# Patient Record
Sex: MALE | Race: WHITE | NOT HISPANIC OR LATINO | Employment: UNEMPLOYED
[De-identification: names, ages, dates, MRNs, and addresses within clinical notes are randomized per-mention and may not be internally consistent; named-entity substitution may affect disease eponyms.]

---

## 2017-10-19 ENCOUNTER — HOSPITAL (OUTPATIENT)
Dept: OTHER | Age: 67
End: 2017-10-19

## 2018-06-15 ENCOUNTER — HOSPITAL (OUTPATIENT)
Dept: OTHER | Age: 68
End: 2018-06-15
Attending: SPECIALIST

## 2020-12-07 DIAGNOSIS — R06.02 SOB (SHORTNESS OF BREATH): Primary | ICD-10-CM

## 2020-12-09 ENCOUNTER — HOSPITAL ENCOUNTER (OUTPATIENT)
Dept: CARDIOLOGY | Age: 70
Discharge: HOME | End: 2020-12-09
Attending: SPECIALIST

## 2020-12-09 ENCOUNTER — HOSPITAL ENCOUNTER (OUTPATIENT)
Dept: NUCLEAR MEDICINE | Age: 70
Discharge: HOME | End: 2020-12-09
Attending: SPECIALIST

## 2020-12-09 ENCOUNTER — LAB SERVICES (OUTPATIENT)
Dept: LAB | Age: 70
End: 2020-12-09
Attending: SPECIALIST

## 2020-12-09 DIAGNOSIS — R06.02 SOB (SHORTNESS OF BREATH): ICD-10-CM

## 2020-12-09 DIAGNOSIS — D69.6 THROMBOCYTOPENIA, UNSPECIFIED (CMD): ICD-10-CM

## 2020-12-09 DIAGNOSIS — N40.3 NODULAR PROSTATE WITH URINARY OBSTRUCTION: ICD-10-CM

## 2020-12-09 DIAGNOSIS — R94.31 ABNORMAL ECG: ICD-10-CM

## 2020-12-09 DIAGNOSIS — R97.20 ELEVATED PROSTATE SPECIFIC ANTIGEN (PSA): Primary | ICD-10-CM

## 2020-12-09 DIAGNOSIS — N13.8 NODULAR PROSTATE WITH URINARY OBSTRUCTION: ICD-10-CM

## 2020-12-09 LAB
ALBUMIN SERPL-MCNC: 3.5 G/DL (ref 3.6–5.1)
ALBUMIN/GLOB SERPL: 1.1 {RATIO} (ref 1–2.4)
ALP SERPL-CCNC: 92 UNITS/L (ref 45–117)
ALT SERPL-CCNC: 24 UNITS/L
ANION GAP SERPL CALC-SCNC: 12 MMOL/L (ref 10–20)
APPEARANCE UR: CLEAR
AST SERPL-CCNC: 18 UNITS/L
BACTERIA #/AREA URNS HPF: ABNORMAL /HPF
BASOPHILS # BLD: 0 K/MCL (ref 0–0.3)
BASOPHILS NFR BLD: 1 %
BILIRUB SERPL-MCNC: 0.7 MG/DL (ref 0.2–1)
BILIRUB UR QL STRIP: NEGATIVE
BUN SERPL-MCNC: 17 MG/DL (ref 6–20)
BUN/CREAT SERPL: 26 (ref 7–25)
CALCIUM SERPL-MCNC: 8.2 MG/DL (ref 8.4–10.2)
CHLORIDE SERPL-SCNC: 103 MMOL/L (ref 98–107)
CO2 SERPL-SCNC: 26 MMOL/L (ref 21–32)
COLOR UR: YELLOW
CREAT SERPL-MCNC: 0.65 MG/DL (ref 0.67–1.17)
DEPRECATED RDW RBC: 43.5 FL (ref 39–50)
EOSINOPHIL # BLD: 0.1 K/MCL (ref 0–0.5)
EOSINOPHIL NFR BLD: 2 %
ERYTHROCYTE [DISTWIDTH] IN BLOOD: 13 % (ref 11–15)
FASTING DURATION TIME PATIENT: ABNORMAL H
FOLATE SERPL-MCNC: 18.3 NG/ML
GFR SERPLBLD BASED ON 1.73 SQ M-ARVRAT: >90 ML/MIN/1.73M2
GLOBULIN SER-MCNC: 3.3 G/DL (ref 2–4)
GLUCOSE SERPL-MCNC: 107 MG/DL (ref 65–99)
GLUCOSE UR STRIP-MCNC: NEGATIVE MG/DL
HCT VFR BLD CALC: 47 % (ref 39–51)
HGB BLD-MCNC: 15.6 G/DL (ref 13–17)
HGB UR QL STRIP: NEGATIVE
HYALINE CASTS #/AREA URNS LPF: ABNORMAL /LPF
IMM GRANULOCYTES # BLD AUTO: 0 K/MCL (ref 0–0.2)
IMM GRANULOCYTES # BLD: 0 %
KETONES UR STRIP-MCNC: NEGATIVE MG/DL
LEUKOCYTE ESTERASE UR QL STRIP: NEGATIVE
LYMPHOCYTES # BLD: 0.7 K/MCL (ref 1–4)
LYMPHOCYTES NFR BLD: 22 %
MCH RBC QN AUTO: 30.1 PG (ref 26–34)
MCHC RBC AUTO-ENTMCNC: 33.2 G/DL (ref 32–36.5)
MCV RBC AUTO: 90.7 FL (ref 78–100)
MONOCYTES # BLD: 0.4 K/MCL (ref 0.3–0.9)
MONOCYTES NFR BLD: 11 %
NEUTROPHILS # BLD: 2.2 K/MCL (ref 1.8–7.7)
NEUTROPHILS NFR BLD: 64 %
NITRITE UR QL STRIP: NEGATIVE
NRBC BLD MANUAL-RTO: 0 /100 WBC
PH UR STRIP: 7.5 [PH] (ref 5–7)
PLATELET # BLD AUTO: 122 K/MCL (ref 140–450)
POTASSIUM SERPL-SCNC: 3.8 MMOL/L (ref 3.4–5.1)
PROT SERPL-MCNC: 6.8 G/DL (ref 6.4–8.2)
PROT UR STRIP-MCNC: NEGATIVE MG/DL
PSA SERPL-MCNC: 1.61 NG/ML
RBC # BLD: 5.18 MIL/MCL (ref 4.5–5.9)
RBC #/AREA URNS HPF: ABNORMAL /HPF
SODIUM SERPL-SCNC: 137 MMOL/L (ref 135–145)
SP GR UR STRIP: 1.01 (ref 1–1.03)
SQUAMOUS #/AREA URNS HPF: ABNORMAL /HPF
UROBILINOGEN UR STRIP-MCNC: 0.2 MG/DL
VIT B12 SERPL-MCNC: 341 PG/ML (ref 211–911)
WBC # BLD: 3.4 K/MCL (ref 4.2–11)
WBC #/AREA URNS HPF: ABNORMAL /HPF

## 2020-12-09 PROCEDURE — 84153 ASSAY OF PSA TOTAL: CPT

## 2020-12-09 PROCEDURE — 85025 COMPLETE CBC W/AUTO DIFF WBC: CPT

## 2020-12-09 PROCEDURE — 81001 URINALYSIS AUTO W/SCOPE: CPT

## 2020-12-09 PROCEDURE — A9502 TC99M TETROFOSMIN: HCPCS

## 2020-12-09 PROCEDURE — 80053 COMPREHEN METABOLIC PANEL: CPT

## 2020-12-09 PROCEDURE — 82607 VITAMIN B-12: CPT

## 2020-12-09 PROCEDURE — 93018 CV STRESS TEST I&R ONLY: CPT | Performed by: INTERNAL MEDICINE

## 2020-12-09 PROCEDURE — 78452 HT MUSCLE IMAGE SPECT MULT: CPT

## 2020-12-09 PROCEDURE — 87086 URINE CULTURE/COLONY COUNT: CPT

## 2020-12-09 PROCEDURE — 36415 COLL VENOUS BLD VENIPUNCTURE: CPT

## 2020-12-09 PROCEDURE — 10006150 HB RX 343

## 2020-12-09 PROCEDURE — 93017 CV STRESS TEST TRACING ONLY: CPT

## 2020-12-09 RX ADMIN — TETROFOSMIN 7.1 MILLICURIE: 1.38 INJECTION, POWDER, LYOPHILIZED, FOR SOLUTION INTRAVENOUS at 09:35

## 2020-12-09 RX ADMIN — TETROFOSMIN 22.8 MILLICURIE: 1.38 INJECTION, POWDER, LYOPHILIZED, FOR SOLUTION INTRAVENOUS at 11:15

## 2020-12-09 ASSESSMENT — PAIN SCALES - GENERAL: PAINLEVEL: 0

## 2020-12-11 LAB — BACTERIA UR CULT: NORMAL

## 2021-05-25 VITALS — DIASTOLIC BLOOD PRESSURE: 79 MMHG | RESPIRATION RATE: 14 BRPM | SYSTOLIC BLOOD PRESSURE: 116 MMHG | HEART RATE: 86 BPM

## 2022-02-16 ENCOUNTER — LAB SERVICES (OUTPATIENT)
Dept: LAB | Age: 72
End: 2022-02-16
Attending: UROLOGY

## 2022-02-16 DIAGNOSIS — N30.00 ACUTE CYSTITIS WITHOUT HEMATURIA: Primary | ICD-10-CM

## 2022-02-16 DIAGNOSIS — R97.20 ELEVATED PROSTATE SPECIFIC ANTIGEN (PSA): ICD-10-CM

## 2022-02-16 DIAGNOSIS — N40.1 BENIGN PROSTATIC HYPERPLASIA WITH LOWER URINARY TRACT SYMPTOMS, SYMPTOM DETAILS UNSPECIFIED: ICD-10-CM

## 2022-02-16 LAB
ANION GAP SERPL CALC-SCNC: 13 MMOL/L (ref 10–20)
APPEARANCE UR: CLEAR
BACTERIA #/AREA URNS HPF: ABNORMAL /HPF
BASOPHILS # BLD: 0 K/MCL (ref 0–0.3)
BASOPHILS NFR BLD: 1 %
BILIRUB UR QL STRIP: NEGATIVE
BUN SERPL-MCNC: 21 MG/DL (ref 6–20)
BUN/CREAT SERPL: 25 (ref 7–25)
CALCIUM SERPL-MCNC: 8.1 MG/DL (ref 8.4–10.2)
CHLORIDE SERPL-SCNC: 106 MMOL/L (ref 98–107)
CO2 SERPL-SCNC: 26 MMOL/L (ref 21–32)
COLOR UR: YELLOW
CREAT SERPL-MCNC: 0.83 MG/DL (ref 0.67–1.17)
DEPRECATED RDW RBC: 42.9 FL (ref 39–50)
EOSINOPHIL # BLD: 0 K/MCL (ref 0–0.5)
EOSINOPHIL NFR BLD: 1 %
ERYTHROCYTE [DISTWIDTH] IN BLOOD: 13.2 % (ref 11–15)
FASTING DURATION TIME PATIENT: ABNORMAL H
GFR SERPLBLD BASED ON 1.73 SQ M-ARVRAT: 89 ML/MIN
GLUCOSE SERPL-MCNC: 114 MG/DL (ref 70–99)
GLUCOSE UR STRIP-MCNC: NEGATIVE MG/DL
HCT VFR BLD CALC: 47.8 % (ref 39–51)
HGB BLD-MCNC: 16.2 G/DL (ref 13–17)
HGB UR QL STRIP: ABNORMAL
HYALINE CASTS #/AREA URNS LPF: ABNORMAL /LPF
IMM GRANULOCYTES # BLD AUTO: 0 K/MCL (ref 0–0.2)
IMM GRANULOCYTES # BLD: 0 %
KETONES UR STRIP-MCNC: NEGATIVE MG/DL
LEUKOCYTE ESTERASE UR QL STRIP: NEGATIVE
LYMPHOCYTES # BLD: 0.9 K/MCL (ref 1–4)
LYMPHOCYTES NFR BLD: 25 %
MCH RBC QN AUTO: 30.6 PG (ref 26–34)
MCHC RBC AUTO-ENTMCNC: 33.9 G/DL (ref 32–36.5)
MCV RBC AUTO: 90.2 FL (ref 78–100)
MONOCYTES # BLD: 0.3 K/MCL (ref 0.3–0.9)
MONOCYTES NFR BLD: 9 %
NEUTROPHILS # BLD: 2.3 K/MCL (ref 1.8–7.7)
NEUTROPHILS NFR BLD: 64 %
NITRITE UR QL STRIP: NEGATIVE
NRBC BLD MANUAL-RTO: 0 /100 WBC
PH UR STRIP: 6 [PH] (ref 5–7)
PLATELET # BLD AUTO: 118 K/MCL (ref 140–450)
POTASSIUM SERPL-SCNC: 3.8 MMOL/L (ref 3.4–5.1)
PROT UR STRIP-MCNC: NEGATIVE MG/DL
PSA SERPL-MCNC: 1.64 NG/ML
RBC # BLD: 5.3 MIL/MCL (ref 4.5–5.9)
RBC #/AREA URNS HPF: ABNORMAL /HPF
SODIUM SERPL-SCNC: 141 MMOL/L (ref 135–145)
SP GR UR STRIP: >=1.03 (ref 1–1.03)
SQUAMOUS #/AREA URNS HPF: ABNORMAL /HPF
UROBILINOGEN UR STRIP-MCNC: 0.2 MG/DL
WBC # BLD: 3.6 K/MCL (ref 4.2–11)
WBC #/AREA URNS HPF: ABNORMAL /HPF

## 2022-02-16 PROCEDURE — 84153 ASSAY OF PSA TOTAL: CPT

## 2022-02-16 PROCEDURE — 36415 COLL VENOUS BLD VENIPUNCTURE: CPT

## 2022-02-16 PROCEDURE — 85025 COMPLETE CBC W/AUTO DIFF WBC: CPT

## 2022-02-16 PROCEDURE — 87086 URINE CULTURE/COLONY COUNT: CPT

## 2022-02-16 PROCEDURE — 81001 URINALYSIS AUTO W/SCOPE: CPT

## 2022-02-16 PROCEDURE — 80048 BASIC METABOLIC PNL TOTAL CA: CPT

## 2022-02-17 LAB — BACTERIA UR CULT: NORMAL

## 2022-03-22 ENCOUNTER — LAB SERVICES (OUTPATIENT)
Dept: LAB | Age: 72
End: 2022-03-22
Attending: UROLOGY

## 2022-03-22 DIAGNOSIS — N30.00 ACUTE CYSTITIS WITHOUT HEMATURIA: Primary | ICD-10-CM

## 2022-03-22 LAB
APPEARANCE UR: CLEAR
BACTERIA #/AREA URNS HPF: ABNORMAL /HPF
BILIRUB UR QL STRIP: NEGATIVE
COLOR UR: YELLOW
GLUCOSE UR STRIP-MCNC: NEGATIVE MG/DL
HGB UR QL STRIP: ABNORMAL
HYALINE CASTS #/AREA URNS LPF: ABNORMAL /LPF
KETONES UR STRIP-MCNC: NEGATIVE MG/DL
LEUKOCYTE ESTERASE UR QL STRIP: NEGATIVE
NITRITE UR QL STRIP: NEGATIVE
PH UR STRIP: 6 [PH] (ref 5–7)
PROT UR STRIP-MCNC: NEGATIVE MG/DL
RBC #/AREA URNS HPF: ABNORMAL /HPF
SP GR UR STRIP: >=1.03 (ref 1–1.03)
SQUAMOUS #/AREA URNS HPF: ABNORMAL /HPF
UROBILINOGEN UR STRIP-MCNC: 0.2 MG/DL
WBC #/AREA URNS HPF: ABNORMAL /HPF

## 2022-03-22 PROCEDURE — 81001 URINALYSIS AUTO W/SCOPE: CPT

## 2022-03-22 PROCEDURE — 87086 URINE CULTURE/COLONY COUNT: CPT

## 2022-03-23 LAB — BACTERIA UR CULT: NORMAL

## 2022-06-29 ENCOUNTER — LAB SERVICES (OUTPATIENT)
Dept: LAB | Age: 72
End: 2022-06-29
Attending: UROLOGY

## 2022-06-29 DIAGNOSIS — N30.00 ACUTE CYSTITIS WITHOUT HEMATURIA: Primary | ICD-10-CM

## 2022-06-29 LAB
APPEARANCE UR: CLEAR
BACTERIA #/AREA URNS HPF: ABNORMAL /HPF
BILIRUB UR QL STRIP: NEGATIVE
COLOR UR: YELLOW
GLUCOSE UR STRIP-MCNC: NEGATIVE MG/DL
HGB UR QL STRIP: ABNORMAL
HYALINE CASTS #/AREA URNS LPF: ABNORMAL /LPF
KETONES UR STRIP-MCNC: NEGATIVE MG/DL
LEUKOCYTE ESTERASE UR QL STRIP: ABNORMAL
NITRITE UR QL STRIP: NEGATIVE
PH UR STRIP: 6 [PH] (ref 5–7)
PROT UR STRIP-MCNC: NEGATIVE MG/DL
RBC #/AREA URNS HPF: ABNORMAL /HPF
SP GR UR STRIP: 1.02 (ref 1–1.03)
SQUAMOUS #/AREA URNS HPF: ABNORMAL /HPF
UROBILINOGEN UR STRIP-MCNC: 0.2 MG/DL
WBC #/AREA URNS HPF: ABNORMAL /HPF

## 2022-06-29 PROCEDURE — 81001 URINALYSIS AUTO W/SCOPE: CPT

## 2022-06-29 PROCEDURE — 87086 URINE CULTURE/COLONY COUNT: CPT

## 2022-06-30 LAB — BACTERIA UR CULT: NORMAL

## 2023-05-24 PROBLEM — D69.6 THROMBOCYTOPENIA (CMD): Status: ACTIVE | Noted: 2023-05-24

## 2023-05-24 PROBLEM — I49.3 PVC'S (PREMATURE VENTRICULAR CONTRACTIONS): Status: ACTIVE | Noted: 2023-05-24

## 2023-05-24 PROBLEM — R55 NEAR SYNCOPE: Status: ACTIVE | Noted: 2023-05-24

## 2023-05-24 PROBLEM — D72.819 LEUKOPENIA: Status: ACTIVE | Noted: 2023-05-24

## 2023-05-24 PROBLEM — Z78.9 NEVER SMOKED CIGARETTES: Status: ACTIVE | Noted: 2023-05-24

## 2023-05-24 PROBLEM — G47.33 OSA (OBSTRUCTIVE SLEEP APNEA): Status: ACTIVE | Noted: 2023-05-24

## 2023-05-24 PROBLEM — Z82.49 FAMILY HISTORY OF PREMATURE CAD: Status: ACTIVE | Noted: 2023-05-24

## 2023-06-07 ENCOUNTER — APPOINTMENT (OUTPATIENT)
Dept: CARDIOLOGY | Age: 73
End: 2023-06-07
Attending: INTERNAL MEDICINE

## 2023-06-10 ENCOUNTER — HOSPITAL ENCOUNTER (OUTPATIENT)
Dept: CARDIOLOGY | Age: 73
Discharge: HOME | End: 2023-06-10
Attending: INTERNAL MEDICINE

## 2023-06-10 VITALS — BODY MASS INDEX: 26.04 KG/M2 | WEIGHT: 186 LBS | HEIGHT: 71 IN

## 2023-06-10 DIAGNOSIS — I49.3 PVC'S (PREMATURE VENTRICULAR CONTRACTIONS): ICD-10-CM

## 2023-06-10 DIAGNOSIS — R55 NEAR SYNCOPE: ICD-10-CM

## 2023-06-10 PROCEDURE — 93018 CV STRESS TEST I&R ONLY: CPT | Performed by: INTERNAL MEDICINE

## 2023-06-10 PROCEDURE — 93016 CV STRESS TEST SUPVJ ONLY: CPT | Performed by: INTERNAL MEDICINE

## 2023-06-10 PROCEDURE — 93017 CV STRESS TEST TRACING ONLY: CPT

## 2023-06-12 LAB — STRESS TARGET HR: 148 BPM

## 2023-11-21 ENCOUNTER — LAB SERVICES (OUTPATIENT)
Dept: LAB | Age: 73
End: 2023-11-21
Attending: UROLOGY

## 2023-11-21 DIAGNOSIS — N30.00 ACUTE CYSTITIS WITHOUT HEMATURIA: Primary | ICD-10-CM

## 2023-11-21 LAB
APPEARANCE UR: CLEAR
BACTERIA #/AREA URNS HPF: ABNORMAL /HPF
BILIRUB UR QL STRIP: NEGATIVE
COLOR UR: YELLOW
GLUCOSE UR STRIP-MCNC: NEGATIVE MG/DL
HGB UR QL STRIP: ABNORMAL
HYALINE CASTS #/AREA URNS LPF: ABNORMAL /LPF
KETONES UR STRIP-MCNC: NEGATIVE MG/DL
LEUKOCYTE ESTERASE UR QL STRIP: NEGATIVE
MUCOUS THREADS URNS QL MICRO: PRESENT
NITRITE UR QL STRIP: NEGATIVE
PH UR STRIP: 5.5 [PH] (ref 5–7)
PROT UR STRIP-MCNC: ABNORMAL MG/DL
RBC #/AREA URNS HPF: ABNORMAL /HPF
SP GR UR STRIP: 1.03 (ref 1–1.03)
SQUAMOUS #/AREA URNS HPF: ABNORMAL /HPF
UROBILINOGEN UR STRIP-MCNC: 0.2 MG/DL
WBC #/AREA URNS HPF: ABNORMAL /HPF

## 2023-11-21 PROCEDURE — 87086 URINE CULTURE/COLONY COUNT: CPT

## 2023-11-21 PROCEDURE — 81001 URINALYSIS AUTO W/SCOPE: CPT

## 2023-11-22 LAB — BACTERIA UR CULT: NO GROWTH

## 2023-11-29 ENCOUNTER — EXTERNAL RECORD (OUTPATIENT)
Dept: HEALTH INFORMATION MANAGEMENT | Facility: OTHER | Age: 73
End: 2023-11-29

## 2023-12-04 ENCOUNTER — APPOINTMENT (OUTPATIENT)
Dept: URBAN - METROPOLITAN AREA CLINIC 240 | Age: 73
Setting detail: DERMATOLOGY
End: 2023-12-04

## 2023-12-04 DIAGNOSIS — Z71.89 OTHER SPECIFIED COUNSELING: ICD-10-CM

## 2023-12-04 DIAGNOSIS — L82.0 INFLAMED SEBORRHEIC KERATOSIS: ICD-10-CM

## 2023-12-04 DIAGNOSIS — D49.2 NEOPLASM OF UNSPECIFIED BEHAVIOR OF BONE, SOFT TISSUE, AND SKIN: ICD-10-CM

## 2023-12-04 PROCEDURE — OTHER LIQUID NITROGEN: OTHER

## 2023-12-04 PROCEDURE — OTHER PHOTO-DOCUMENTATION: OTHER

## 2023-12-04 PROCEDURE — 17111 DESTRUCT LESION 15 OR MORE: CPT

## 2023-12-04 PROCEDURE — 11102 TANGNTL BX SKIN SINGLE LES: CPT | Mod: 59

## 2023-12-04 PROCEDURE — OTHER BIOPSY BY SHAVE METHOD: OTHER

## 2023-12-04 PROCEDURE — 99212 OFFICE O/P EST SF 10 MIN: CPT | Mod: 25

## 2023-12-04 PROCEDURE — OTHER MIPS QUALITY: OTHER

## 2023-12-04 PROCEDURE — OTHER COUNSELING: OTHER

## 2023-12-04 ASSESSMENT — LOCATION DETAILED DESCRIPTION DERM
LOCATION DETAILED: LEFT SUPERIOR CENTRAL MALAR CHEEK
LOCATION DETAILED: RIGHT SUPERIOR PARIETAL SCALP
LOCATION DETAILED: RIGHT SUPERIOR OCCIPITAL SCALP
LOCATION DETAILED: LEFT SUPERIOR LATERAL BUCCAL CHEEK
LOCATION DETAILED: RIGHT CENTRAL MALAR CHEEK
LOCATION DETAILED: RIGHT LATERAL FRONTAL SCALP
LOCATION DETAILED: MID-FRONTAL SCALP
LOCATION DETAILED: LEFT CENTRAL FRONTAL SCALP
LOCATION DETAILED: LEFT MID PREAURICULAR CHEEK
LOCATION DETAILED: LEFT SUPERIOR OCCIPITAL SCALP
LOCATION DETAILED: LEFT FOREHEAD
LOCATION DETAILED: RIGHT SUPERIOR LATERAL FOREHEAD
LOCATION DETAILED: RIGHT SUPERIOR FOREHEAD
LOCATION DETAILED: LEFT SUPERIOR PARIETAL SCALP
LOCATION DETAILED: LEFT LATERAL PROXIMAL UPPER ARM
LOCATION DETAILED: LEFT SUPERIOR FOREHEAD
LOCATION DETAILED: RIGHT MEDIAL FRONTAL SCALP
LOCATION DETAILED: LEFT INFERIOR TEMPLE

## 2023-12-04 ASSESSMENT — LOCATION ZONE DERM
LOCATION ZONE: ARM
LOCATION ZONE: FACE
LOCATION ZONE: SCALP

## 2023-12-04 ASSESSMENT — LOCATION SIMPLE DESCRIPTION DERM
LOCATION SIMPLE: RIGHT SCALP
LOCATION SIMPLE: SCALP
LOCATION SIMPLE: LEFT SCALP
LOCATION SIMPLE: LEFT UPPER ARM
LOCATION SIMPLE: LEFT FOREHEAD
LOCATION SIMPLE: RIGHT OCCIPITAL SCALP
LOCATION SIMPLE: LEFT CHEEK
LOCATION SIMPLE: LEFT TEMPLE
LOCATION SIMPLE: LEFT OCCIPITAL SCALP
LOCATION SIMPLE: ANTERIOR SCALP
LOCATION SIMPLE: RIGHT FOREHEAD
LOCATION SIMPLE: RIGHT CHEEK

## 2023-12-04 NOTE — PROCEDURE: LIQUID NITROGEN
Detail Level: Detailed
Show Topical Anesthesia Variable?: Yes
Duration Of Freeze Thaw-Cycle (Seconds): 10-15
Medical Necessity Information: It is in your best interest to select a reason for this procedure from the list below. All of these items fulfill various CMS LCD requirements except the new and changing color options.
Consent: The patient's consent was obtained including but not limited to risks of crusting, scabbing, blistering, scarring, darker or lighter pigmentary change, recurrence, incomplete removal and infection.
Spray Paint Text: The liquid nitrogen was applied to the skin utilizing a spray paint frosting technique.
Medical Necessity Clause: This procedure was medically necessary because the lesions that were treated were:
Render Note In Bullet Format When Appropriate: No
Post-Care Instructions: I reviewed with the patient in detail post-care instructions. Patient is to wear sunprotection, and avoid picking at any of the treated lesions. Pt may apply Vaseline to crusted or scabbing areas.
Number Of Freeze-Thaw Cycles: 2 freeze-thaw cycles

## 2023-12-04 NOTE — PROCEDURE: BIOPSY BY SHAVE METHOD
Detail Level: Detailed
Depth Of Biopsy: dermis
Was A Bandage Applied: Yes
Size Of Lesion In Cm: 1.2
X Size Of Lesion In Cm: 1.3
Biopsy Type: H and E
Biopsy Method: Personna blade
Anesthesia Type: 1% lidocaine with epinephrine
Anesthesia Volume In Cc: 0.5
Additional Anesthesia Volume In Cc (Will Not Render If 0): 0
Hemostasis: Ferric chloride
Wound Care: Petrolatum
Dressing: Band-Aid
Destruction After The Procedure: No
Type Of Destruction Used: Curettage
Curettage Text: The wound bed was treated with curettage after the biopsy was performed.
Cryotherapy Text: The wound bed was treated with cryotherapy after the biopsy was performed.
Electrodesiccation Text: The wound bed was treated with electrodesiccation after the biopsy was performed.
Electrodesiccation And Curettage Text: The wound bed was treated with electrodesiccation and curettage after the biopsy was performed.
Silver Nitrate Text: The wound bed was treated with silver nitrate after the biopsy was performed.
Lab: [id number]
Consent: Written consent was obtained and risks were reviewed including but not limited to scarring, infection, bleeding, scabbing, incomplete removal, nerve damage and allergy to anesthesia.
Post-Care Instructions: I reviewed with the patient in detail post-care instructions. Patient is to keep the biopsy site dry overnight, and then apply bacitracin twice daily until healed. Patient may apply hydrogen peroxide soaks to remove any crusting.
Notification Instructions: Patient will be notified of biopsy results. However, patient instructed to call the office if not contacted within 2 weeks.
Billing Type: Third-Party Bill
Information: Selecting Yes will display possible errors in your note based on the variables you have selected. This validation is only offered as a suggestion for you. PLEASE NOTE THAT THE VALIDATION TEXT WILL BE REMOVED WHEN YOU FINALIZE YOUR NOTE. IF YOU WANT TO FAX A PRELIMINARY NOTE YOU WILL NEED TO TOGGLE THIS TO 'NO' IF YOU DO NOT WANT IT IN YOUR FAXED NOTE.

## 2024-01-09 ENCOUNTER — APPOINTMENT (OUTPATIENT)
Dept: URBAN - METROPOLITAN AREA CLINIC 240 | Age: 74
Setting detail: DERMATOLOGY
End: 2024-01-11

## 2024-01-09 DIAGNOSIS — L82.0 INFLAMED SEBORRHEIC KERATOSIS: ICD-10-CM

## 2024-01-09 DIAGNOSIS — L57.0 ACTINIC KERATOSIS: ICD-10-CM

## 2024-01-09 DIAGNOSIS — L81.4 OTHER MELANIN HYPERPIGMENTATION: ICD-10-CM

## 2024-01-09 DIAGNOSIS — Z71.89 OTHER SPECIFIED COUNSELING: ICD-10-CM

## 2024-01-09 PROCEDURE — 99212 OFFICE O/P EST SF 10 MIN: CPT | Mod: 25

## 2024-01-09 PROCEDURE — OTHER MIPS QUALITY: OTHER

## 2024-01-09 PROCEDURE — OTHER COUNSELING: OTHER

## 2024-01-09 PROCEDURE — OTHER DIAGNOSIS COMMENT: OTHER

## 2024-01-09 PROCEDURE — OTHER LIQUID NITROGEN: OTHER

## 2024-01-09 PROCEDURE — 17110 DESTRUCT B9 LESION 1-14: CPT

## 2024-01-09 PROCEDURE — 17000 DESTRUCT PREMALG LESION: CPT | Mod: 59

## 2024-01-09 ASSESSMENT — LOCATION ZONE DERM
LOCATION ZONE: SCALP
LOCATION ZONE: FACE
LOCATION ZONE: ARM

## 2024-01-09 ASSESSMENT — LOCATION SIMPLE DESCRIPTION DERM
LOCATION SIMPLE: LEFT FOREHEAD
LOCATION SIMPLE: SCALP
LOCATION SIMPLE: LEFT UPPER ARM

## 2024-01-09 ASSESSMENT — LOCATION DETAILED DESCRIPTION DERM
LOCATION DETAILED: LEFT INFERIOR PARIETAL SCALP
LOCATION DETAILED: LEFT INFERIOR LATERAL FOREHEAD
LOCATION DETAILED: LEFT SUPERIOR LATERAL FOREHEAD
LOCATION DETAILED: RIGHT SUPERIOR POSTAURICULAR SKIN
LOCATION DETAILED: LEFT SUPERIOR FOREHEAD
LOCATION DETAILED: LEFT LATERAL PROXIMAL UPPER ARM
LOCATION DETAILED: LEFT ANTERIOR DISTAL UPPER ARM
LOCATION DETAILED: LEFT FOREHEAD

## 2024-01-09 NOTE — PROCEDURE: LIQUID NITROGEN
Render Post-Care Instructions In Note?: no
Number Of Freeze-Thaw Cycles: 2 freeze-thaw cycles
Consent: The patient's consent was obtained including but not limited to risks of crusting, scabbing, blistering, scarring, darker or lighter pigmentary change, recurrence, incomplete removal and infection.
Show Aperture Variable?: Yes
Detail Level: Detailed
Duration Of Freeze Thaw-Cycle (Seconds): 5
Post-Care Instructions: I reviewed with the patient in detail post-care instructions. Patient is to wear sunprotection, and avoid picking at any of the treated lesions. Pt may apply Vaseline to crusted or scabbing areas.
Medical Necessity Information: It is in your best interest to select a reason for this procedure from the list below. All of these items fulfill various CMS LCD requirements except the new and changing color options.
Spray Paint Text: The liquid nitrogen was applied to the skin utilizing a spray paint frosting technique.
Duration Of Freeze Thaw-Cycle (Seconds): 10-15
Medical Necessity Clause: This procedure was medically necessary because the lesions that were treated were:

## 2024-02-19 PROBLEM — Z78.9 FULL CODE STATUS: Status: ACTIVE | Noted: 2024-02-19

## 2024-02-19 PROBLEM — Z71.89 ADVANCED CARE PLANNING/COUNSELING DISCUSSION: Status: ACTIVE | Noted: 2024-02-19

## 2024-02-21 ENCOUNTER — LAB SERVICES (OUTPATIENT)
Dept: LAB | Age: 74
End: 2024-02-21
Attending: UROLOGY

## 2024-02-21 DIAGNOSIS — R97.20 ELEVATED PROSTATE SPECIFIC ANTIGEN (PSA): Primary | ICD-10-CM

## 2024-02-21 LAB
ANION GAP SERPL CALC-SCNC: 15 MMOL/L (ref 7–19)
BUN SERPL-MCNC: 16 MG/DL (ref 6–20)
BUN/CREAT SERPL: 20 (ref 7–25)
CALCIUM SERPL-MCNC: 8.6 MG/DL (ref 8.4–10.2)
CHLORIDE SERPL-SCNC: 105 MMOL/L (ref 97–110)
CO2 SERPL-SCNC: 25 MMOL/L (ref 21–32)
CREAT SERPL-MCNC: 0.8 MG/DL (ref 0.67–1.17)
EGFRCR SERPLBLD CKD-EPI 2021: >90 ML/MIN/{1.73_M2}
FASTING DURATION TIME PATIENT: ABNORMAL H
GLUCOSE SERPL-MCNC: 103 MG/DL (ref 70–99)
POTASSIUM SERPL-SCNC: 3.8 MMOL/L (ref 3.4–5.1)
SODIUM SERPL-SCNC: 141 MMOL/L (ref 135–145)

## 2024-02-21 PROCEDURE — 80048 BASIC METABOLIC PNL TOTAL CA: CPT

## 2024-02-21 PROCEDURE — 84153 ASSAY OF PSA TOTAL: CPT

## 2024-02-21 PROCEDURE — 36415 COLL VENOUS BLD VENIPUNCTURE: CPT

## 2024-02-22 LAB — PSA SERPL-MCNC: 1.95 NG/ML

## 2024-02-28 DIAGNOSIS — N30.00 ACUTE CYSTITIS WITHOUT HEMATURIA: Primary | ICD-10-CM

## 2024-04-02 DIAGNOSIS — N40.1 BENIGN LOCALIZED PROSTATIC HYPERPLASIA WITH LOWER URINARY TRACT SYMPTOMS (LUTS): Primary | ICD-10-CM

## 2024-04-11 ENCOUNTER — HOSPITAL ENCOUNTER (OUTPATIENT)
Dept: ULTRASOUND IMAGING | Age: 74
Discharge: HOME | End: 2024-04-11
Attending: UROLOGY

## 2024-04-11 DIAGNOSIS — N30.00 ACUTE CYSTITIS WITHOUT HEMATURIA: ICD-10-CM

## 2024-04-11 PROCEDURE — 76770 US EXAM ABDO BACK WALL COMP: CPT

## 2024-10-08 ENCOUNTER — APPOINTMENT (OUTPATIENT)
Dept: URBAN - METROPOLITAN AREA CLINIC 240 | Age: 74
Setting detail: DERMATOLOGY
End: 2024-10-12

## 2024-10-08 VITALS — WEIGHT: 185 LBS | HEIGHT: 70 IN

## 2024-10-08 DIAGNOSIS — L82.1 OTHER SEBORRHEIC KERATOSIS: ICD-10-CM

## 2024-10-08 DIAGNOSIS — D18.0 HEMANGIOMA: ICD-10-CM

## 2024-10-08 DIAGNOSIS — L82.0 INFLAMED SEBORRHEIC KERATOSIS: ICD-10-CM

## 2024-10-08 PROBLEM — D18.01 HEMANGIOMA OF SKIN AND SUBCUTANEOUS TISSUE: Status: ACTIVE | Noted: 2024-10-08

## 2024-10-08 PROCEDURE — 17110 DESTRUCT B9 LESION 1-14: CPT

## 2024-10-08 PROCEDURE — OTHER COUNSELING: OTHER

## 2024-10-08 PROCEDURE — 99212 OFFICE O/P EST SF 10 MIN: CPT | Mod: 25

## 2024-10-08 PROCEDURE — OTHER MIPS QUALITY: OTHER

## 2024-10-08 PROCEDURE — OTHER LIQUID NITROGEN: OTHER

## 2024-10-08 ASSESSMENT — LOCATION ZONE DERM
LOCATION ZONE: SCALP
LOCATION ZONE: ARM
LOCATION ZONE: TRUNK
LOCATION ZONE: FACE

## 2024-10-08 ASSESSMENT — LOCATION SIMPLE DESCRIPTION DERM
LOCATION SIMPLE: LEFT FOREHEAD
LOCATION SIMPLE: RIGHT SCALP
LOCATION SIMPLE: RIGHT FOREHEAD
LOCATION SIMPLE: SCALP
LOCATION SIMPLE: LEFT SCALP
LOCATION SIMPLE: LEFT ZYGOMA
LOCATION SIMPLE: LEFT UPPER ARM
LOCATION SIMPLE: LEFT CHEEK
LOCATION SIMPLE: GROIN

## 2024-10-08 ASSESSMENT — LOCATION DETAILED DESCRIPTION DERM
LOCATION DETAILED: RIGHT SUPERIOR LATERAL FOREHEAD
LOCATION DETAILED: LEFT SUPERIOR PARIETAL SCALP
LOCATION DETAILED: LEFT SUPERIOR LATERAL MALAR CHEEK
LOCATION DETAILED: LEFT FOREHEAD
LOCATION DETAILED: RIGHT MEDIAL FRONTAL SCALP
LOCATION DETAILED: LEFT INGUINAL CREASE
LOCATION DETAILED: RIGHT CENTRAL PARIETAL SCALP
LOCATION DETAILED: LEFT ANTERIOR PROXIMAL UPPER ARM
LOCATION DETAILED: RIGHT INFERIOR FOREHEAD
LOCATION DETAILED: LEFT CENTRAL ZYGOMA
LOCATION DETAILED: LEFT CENTRAL PARIETAL SCALP
LOCATION DETAILED: LEFT MEDIAL FRONTAL SCALP

## 2024-10-08 NOTE — PROCEDURE: LIQUID NITROGEN
Render Note In Bullet Format When Appropriate: No
Render Post-Care Instructions In Note?: yes
Post-Care Instructions: I reviewed with the patient in detail post-care instructions. Patient is to wear sunprotection, and avoid picking at any of the treated lesions. Pt may apply Vaseline to crusted or scabbing areas.
Spray Paint Text: The liquid nitrogen was applied to the skin utilizing a spray paint frosting technique.
Number Of Freeze-Thaw Cycles: 1 freeze-thaw cycle
Medical Necessity Clause: This procedure was medically necessary because the lesions that were treated were:
Detail Level: Detailed
Duration Of Freeze Thaw-Cycle (Seconds): 10-15
Medical Necessity Information: It is in your best interest to select a reason for this procedure from the list below. All of these items fulfill various CMS LCD requirements except the new and changing color options.
Consent: The patient's consent was obtained including but not limited to risks of crusting, scabbing, blistering, scarring, darker or lighter pigmentary change, recurrence, incomplete removal and infection.

## 2025-08-01 PROBLEM — I48.91 NEW ONSET ATRIAL FIBRILLATION  (CMD): Status: ACTIVE | Noted: 2025-08-01

## 2025-08-01 PROBLEM — Z87.898 HISTORY OF SYNCOPE: Status: ACTIVE | Noted: 2023-05-24

## 2025-08-14 ENCOUNTER — HOSPITAL ENCOUNTER (OUTPATIENT)
Dept: CARDIOLOGY | Age: 75
Discharge: HOME | End: 2025-08-14
Attending: INTERNAL MEDICINE

## 2025-08-14 ENCOUNTER — HOSPITAL ENCOUNTER (OUTPATIENT)
Dept: NUCLEAR MEDICINE | Age: 75
Discharge: HOME | End: 2025-08-14
Attending: INTERNAL MEDICINE

## 2025-08-14 VITALS — DIASTOLIC BLOOD PRESSURE: 80 MMHG | HEART RATE: 72 BPM | SYSTOLIC BLOOD PRESSURE: 126 MMHG

## 2025-08-14 DIAGNOSIS — I48.91 NEW ONSET ATRIAL FIBRILLATION  (CMD): ICD-10-CM

## 2025-08-14 DIAGNOSIS — I49.3 PVC'S (PREMATURE VENTRICULAR CONTRACTIONS): ICD-10-CM

## 2025-08-14 PROCEDURE — 10006150 HB RX 343: Performed by: INTERNAL MEDICINE

## 2025-08-14 PROCEDURE — 93018 CV STRESS TEST I&R ONLY: CPT | Performed by: INTERNAL MEDICINE

## 2025-08-14 PROCEDURE — 10002800 HB RX 250 W HCPCS: Performed by: INTERNAL MEDICINE

## 2025-08-14 PROCEDURE — 93016 CV STRESS TEST SUPVJ ONLY: CPT | Performed by: INTERNAL MEDICINE

## 2025-08-14 PROCEDURE — 93017 CV STRESS TEST TRACING ONLY: CPT

## 2025-08-14 PROCEDURE — A9502 TC99M TETROFOSMIN: HCPCS | Performed by: INTERNAL MEDICINE

## 2025-08-14 RX ORDER — REGADENOSON 0.08 MG/ML
0.4 INJECTION, SOLUTION INTRAVENOUS ONCE
Status: COMPLETED | OUTPATIENT
Start: 2025-08-14 | End: 2025-08-14

## 2025-08-14 RX ADMIN — REGADENOSON 0.4 MG: 0.08 INJECTION, SOLUTION INTRAVENOUS at 09:41

## 2025-08-14 RX ADMIN — TETROFOSMIN 21.9 MILLICURIE: 1.38 INJECTION, POWDER, LYOPHILIZED, FOR SOLUTION INTRAVENOUS at 09:41

## 2025-08-14 RX ADMIN — TETROFOSMIN 7.3 MILLICURIE: 1.38 INJECTION, POWDER, LYOPHILIZED, FOR SOLUTION INTRAVENOUS at 08:28
